# Patient Record
Sex: FEMALE
[De-identification: names, ages, dates, MRNs, and addresses within clinical notes are randomized per-mention and may not be internally consistent; named-entity substitution may affect disease eponyms.]

---

## 2020-10-26 ENCOUNTER — HOSPITAL ENCOUNTER (OUTPATIENT)
Dept: HOSPITAL 88 - CT | Age: 40
End: 2020-10-26
Attending: UROLOGY
Payer: COMMERCIAL

## 2020-10-26 DIAGNOSIS — N20.1: Primary | ICD-10-CM

## 2020-10-26 PROCEDURE — 74176 CT ABD & PELVIS W/O CONTRAST: CPT

## 2020-10-26 NOTE — DIAGNOSTIC IMAGING REPORT
EXAM: CT Abdomen and Pelvis WITHOUT intravenous contrast  



INDICATION: 

^[id number]

^[id number]

^CALCULUS OF KIDNEY.





COMPARISON: None.



TECHNIQUE: Abdomen and pelvis were scanned utilizing a multidetector helical

scanner from the lung base to the pubic symphysis without administration of IV

contrast. Coronal and sagittal reformations were obtained. Routine technique

was performed. 

     

IV CONTRAST: None

ORAL CONTRAST: None

            

COMPLICATIONS: None



RADIATION DOSE:

Total DLP:  648 mGy*cm

Dose modulation, iterative reconstruction, and/or weight based adjustment of

the mA/kV was utilized to reduce the radiation dose to as low as reasonably

achievable. 



FINDINGS:

LOWER THORAX: Normal.



HEPATOBILIARY: Liver is diffusely hypoattenuating. Gallbladder surgically

absent. Negative for biliary dilatation. No definite suspicious hepatic lesion

is identified.



SPLEEN: No splenomegaly.



PANCREAS: No focal masses or ductal dilatation.



ADRENALS: No adrenal nodules.

KIDNEYS/URETERS: Negative for renal, ureteral or bladder calculus. Negative for

hydronephrosis or perinephric fluid collection. No surrounding inflammatory

changes are noted. Ureters are not dilated.

PELVIC ORGANS/BLADDER: Urinary bladder is unremarkable. Bulky uterus is noted

with large fundal fibroid. Ovaries are noted with probable bilateral

cyst/follicles, left greater than right.



PERITONEUM / RETROPERITONEUM: No free air or fluid. Tiny fat-containing of

local hernia is noted.

LYMPH NODES: No lymphadenopathy.

VESSELS: Unremarkable.



GI TRACT: Negative for bowel obstruction. No surrounding inflammatory changes

are identified. Normal appendix is noted. Significant portion of the colon is

decompressed limiting evaluation. Lack of oral and IV contrast also limits

evaluation.



BONES AND SOFT TISSUES: No acute osseous abnormality. Disc spaces are

relatively well-maintained with mild multilevel narrowing of the lower thoracic

and lumbar spine. No suspicious lytic or blastic lesion is identified. Soft

tissues are unremarkable.



IMPRESSION: 



1. No evidence of urinary tract calculus. Negative for hydronephrosis or

surrounding inflammatory changes.

2. Severe diffuse hepatic steatosis.

3. Bulky uterine fundus concerning for fibroids.

4. Left greater than right ovarian follicles/cysts, likely within normal limits

for patient's age.



Signed by: Alfonso Lakhani MD on 10/26/2020 4:41 PM